# Patient Record
Sex: MALE | Race: WHITE
[De-identification: names, ages, dates, MRNs, and addresses within clinical notes are randomized per-mention and may not be internally consistent; named-entity substitution may affect disease eponyms.]

---

## 2019-06-26 ENCOUNTER — HOSPITAL ENCOUNTER (INPATIENT)
Dept: HOSPITAL 72 - EMR | Age: 74
LOS: 5 days | Discharge: HOME HEALTH | DRG: 684 | End: 2019-07-01
Payer: MEDICARE

## 2019-06-26 VITALS — DIASTOLIC BLOOD PRESSURE: 69 MMHG | SYSTOLIC BLOOD PRESSURE: 156 MMHG

## 2019-06-26 VITALS — WEIGHT: 171 LBS | HEIGHT: 69 IN | BODY MASS INDEX: 25.33 KG/M2

## 2019-06-26 VITALS — SYSTOLIC BLOOD PRESSURE: 125 MMHG | DIASTOLIC BLOOD PRESSURE: 67 MMHG

## 2019-06-26 DIAGNOSIS — R33.9: ICD-10-CM

## 2019-06-26 DIAGNOSIS — N17.9: Primary | ICD-10-CM

## 2019-06-26 DIAGNOSIS — D64.9: ICD-10-CM

## 2019-06-26 DIAGNOSIS — N13.1: ICD-10-CM

## 2019-06-26 DIAGNOSIS — R60.9: ICD-10-CM

## 2019-06-26 DIAGNOSIS — R74.8: ICD-10-CM

## 2019-06-26 DIAGNOSIS — E03.9: ICD-10-CM

## 2019-06-26 DIAGNOSIS — K21.9: ICD-10-CM

## 2019-06-26 DIAGNOSIS — N13.30: ICD-10-CM

## 2019-06-26 DIAGNOSIS — E55.9: ICD-10-CM

## 2019-06-26 DIAGNOSIS — F03.90: ICD-10-CM

## 2019-06-26 DIAGNOSIS — I10: ICD-10-CM

## 2019-06-26 LAB
ADD MANUAL DIFF: NO
ALBUMIN SERPL-MCNC: 3.5 G/DL (ref 3.4–5)
ALBUMIN/GLOB SERPL: 0.8 {RATIO} (ref 1–2.7)
ALP SERPL-CCNC: 76 U/L (ref 46–116)
ALT SERPL-CCNC: 20 U/L (ref 12–78)
ANION GAP SERPL CALC-SCNC: 13 MMOL/L (ref 5–15)
APPEARANCE UR: CLEAR
APTT BLD: 31 SEC (ref 23–33)
APTT PPP: (no result) S
AST SERPL-CCNC: 15 U/L (ref 15–37)
BASOPHILS NFR BLD AUTO: 0.8 % (ref 0–2)
BILIRUB SERPL-MCNC: 0.3 MG/DL (ref 0.2–1)
BUN SERPL-MCNC: 81 MG/DL (ref 7–18)
CALCIUM SERPL-MCNC: 9 MG/DL (ref 8.5–10.1)
CHLORIDE SERPL-SCNC: 101 MMOL/L (ref 98–107)
CK SERPL-CCNC: 124 U/L (ref 26–308)
CO2 SERPL-SCNC: 19 MMOL/L (ref 21–32)
CREAT SERPL-MCNC: 4.7 MG/DL (ref 0.55–1.3)
EOSINOPHIL NFR BLD AUTO: 0 % (ref 0–3)
ERYTHROCYTE [DISTWIDTH] IN BLOOD BY AUTOMATED COUNT: 11.5 % (ref 11.6–14.8)
GLOBULIN SER-MCNC: 4.4 G/DL
GLUCOSE UR STRIP-MCNC: NEGATIVE MG/DL
HCT VFR BLD CALC: 31.6 % (ref 42–52)
HGB BLD-MCNC: 10.2 G/DL (ref 14.2–18)
INR PPP: 1 (ref 0.9–1.1)
KETONES UR QL STRIP: NEGATIVE
LEUKOCYTE ESTERASE UR QL STRIP: NEGATIVE
LYMPHOCYTES NFR BLD AUTO: 8.3 % (ref 20–45)
MCV RBC AUTO: 81 FL (ref 80–99)
MONOCYTES NFR BLD AUTO: 6.8 % (ref 1–10)
NEUTROPHILS NFR BLD AUTO: 84 % (ref 45–75)
NITRITE UR QL STRIP: NEGATIVE
PH UR STRIP: 5 [PH] (ref 4.5–8)
PLATELET # BLD: 421 K/UL (ref 150–450)
POTASSIUM SERPL-SCNC: 4.9 MMOL/L (ref 3.5–5.1)
PROT UR QL STRIP: NEGATIVE
RBC # BLD AUTO: 3.93 M/UL (ref 4.7–6.1)
SODIUM SERPL-SCNC: 133 MMOL/L (ref 136–145)
SP GR UR STRIP: 1.01 (ref 1–1.03)
UROBILINOGEN UR-MCNC: NORMAL MG/DL (ref 0–1)
WBC # BLD AUTO: 11 K/UL (ref 4.8–10.8)

## 2019-06-26 PROCEDURE — 80048 BASIC METABOLIC PNL TOTAL CA: CPT

## 2019-06-26 PROCEDURE — 82550 ASSAY OF CK (CPK): CPT

## 2019-06-26 PROCEDURE — 76770 US EXAM ABDO BACK WALL COMP: CPT

## 2019-06-26 PROCEDURE — 82570 ASSAY OF URINE CREATININE: CPT

## 2019-06-26 PROCEDURE — 85610 PROTHROMBIN TIME: CPT

## 2019-06-26 PROCEDURE — 82607 VITAMIN B-12: CPT

## 2019-06-26 PROCEDURE — 74176 CT ABD & PELVIS W/O CONTRAST: CPT

## 2019-06-26 PROCEDURE — 83690 ASSAY OF LIPASE: CPT

## 2019-06-26 PROCEDURE — 84300 ASSAY OF URINE SODIUM: CPT

## 2019-06-26 PROCEDURE — 82728 ASSAY OF FERRITIN: CPT

## 2019-06-26 PROCEDURE — 85730 THROMBOPLASTIN TIME PARTIAL: CPT

## 2019-06-26 PROCEDURE — 99285 EMERGENCY DEPT VISIT HI MDM: CPT

## 2019-06-26 PROCEDURE — 93005 ELECTROCARDIOGRAM TRACING: CPT

## 2019-06-26 PROCEDURE — 81003 URINALYSIS AUTO W/O SCOPE: CPT

## 2019-06-26 PROCEDURE — 82270 OCCULT BLOOD FECES: CPT

## 2019-06-26 PROCEDURE — 36415 COLL VENOUS BLD VENIPUNCTURE: CPT

## 2019-06-26 PROCEDURE — 71045 X-RAY EXAM CHEST 1 VIEW: CPT

## 2019-06-26 PROCEDURE — 84484 ASSAY OF TROPONIN QUANT: CPT

## 2019-06-26 PROCEDURE — 80053 COMPREHEN METABOLIC PANEL: CPT

## 2019-06-26 PROCEDURE — 83550 IRON BINDING TEST: CPT

## 2019-06-26 PROCEDURE — 83540 ASSAY OF IRON: CPT

## 2019-06-26 PROCEDURE — 85025 COMPLETE CBC W/AUTO DIFF WBC: CPT

## 2019-06-26 NOTE — NUR
HAND-OFF: 

Report given to Jermaine LAWTON. Patient in stable condition.

-------------------------------------------------------------------------------

Addendum: 06/26/19 at 1951 by EUGENIO CODY RN

-------------------------------------------------------------------------------

NURSE NOTES:

Noted light pink color of urine. No complain of pain or discomfort. Notified  and 
no new order at this time. Will continue to monitor.

## 2019-06-26 NOTE — DIAGNOSTIC IMAGING REPORT
Indication:Elevated Bun and Creatinine.

 

Technique: Grayscale and duplex Doppler imaging of the kidneys performed.

 

Comparison: None

 

Findings:

 

Moderate hydronephrosis demonstrated bilaterally. Right kidney length is 11.3 cm and

left kidney length 11 cm. Quan catheter noted in the bladder which is nondistended.

IVC is patent.

 

IMPRESSION:

 

Moderate bilateral hydronephrosis.

 

Quan catheter in good position

## 2019-06-26 NOTE — EMERGENCY ROOM REPORT
History of Present Illness


General


Chief Complaint:  Abnormal Labs


Source:  Patient, PMD





Present Illness


HPI


Patient presents with difficulty urinating.  He was sent by his private 

physician because he had a jump in his serum creatinine and also has edema.  He 

feels fullness in his lower abdomen.  Is uncertain how long this is progressed.

  He is able to produce small quantities of urine. 





The edema has been developing over several months.  It gets better laying down, 

but he has trouble sleeping.  He did not fill a prescription for a water pill.





Apparently labs were performed yesterday and his creatinine bumped up to 4.  

His BNP was normal.


Allergies:  


Coded Allergies:  


     No Known Allergies (Unverified , 6/26/19)





Patient History


Past Medical History:  see triage record


Social History:  Denies: smoking, alcohol use, drug use


Social History Narrative


lives with sister.  Worked in optical lab making glasses


Reviewed Nursing Documentation:  PMH: Agreed; PSxH: Agreed





Review of Systems


All Other Systems:  negative except mentioned in HPI





Physical Exam





Vital Signs








  Date Time  Temp Pulse Resp B/P (MAP) Pulse Ox O2 Delivery O2 Flow Rate FiO2


 


6/26/19 14:51 97.9 104 18 139/75 (96) 98 Room Air  








Sp02 EP Interpretation:  reviewed, normal


General Appearance:  well appearing, no apparent distress, GCS 15


Head:  normocephalic


Eyes:  bilateral eye normal inspection, bilateral eye PERRL


ENT:  moist mucus membranes


Neck:  supple


Respiratory:  lungs clear, normal breath sounds


Cardiovascular #1:  regular rate, rhythm, edema - 2-3+ pitting bilateral lower 

extremities


Cardiovascular #2:  2+ radial (R)


Gastrointestinal:  non tender, soft, mass - Large bladder


Genitourinary:  no CVA tenderness


Musculoskeletal:  back normal, gait/station normal, normal range of motion


Neurologic:  alert, oriented x3, grossly normal


Psychiatric:  mood/affect normal


Skin:  normal inspection, warm/dry





Medical Decision Making


Diagnostic Impression:  


 Primary Impression:  


 Acute renal failure


 Qualified Codes:  N17.9 - Acute kidney failure, unspecified


 Additional Impressions:  


 Urinary retention


 Elevated lipase


ER Course


The patient presents with elevated creatinine edema and urinary retention.  

Differential includes benign prostatic hypertrophy, prostate cancer, medication 

interaction, right heart failure, urinary tract infection amongst others.  The 

patient will be evaluated with labs, EKG chest x-ray abdomen film and renal 

ultrasound.  Quan catheter will be passed.





Initial urine output = 1300 ml.





EKG without injury.  Chest x-ray unremarkable.  Labs with acute renal failure.  

Lipase elevated.  Fractional excretion of sodium less than 1%.





Ultrasound with bilateral hydronephrosis.  This was performed after Quan was 

placed and the bladder was empty at that time.





Patient pain improved.  Admitted to the hospital for continued observation and 

evaluation of renal failure.





Laboratory Tests








Test


  6/26/19


15:00


 


White Blood Count


  11.0 K/UL


(4.8-10.8)  H


 


Red Blood Count


  3.93 M/UL


(4.70-6.10)  L


 


Hemoglobin


  10.2 G/DL


(14.2-18.0)  L


 


Hematocrit


  31.6 %


(42.0-52.0)  L


 


Mean Corpuscular Volume 81 FL (80-99)  


 


Mean Corpuscular Hemoglobin


  26.0 PG


(27.0-31.0)  L


 


Mean Corpuscular Hemoglobin


Concent 32.3 G/DL


(32.0-36.0)


 


Red Cell Distribution Width


  11.5 %


(11.6-14.8)  L


 


Platelet Count


  421 K/UL


(150-450)


 


Mean Platelet Volume


  6.0 FL


(6.5-10.1)  L


 


Neutrophils (%) (Auto)


  84.0 %


(45.0-75.0)  H


 


Lymphocytes (%) (Auto)


  8.3 %


(20.0-45.0)  L


 


Monocytes (%) (Auto)


  6.8 %


(1.0-10.0)


 


Eosinophils (%) (Auto)


  0.0 %


(0.0-3.0)


 


Basophils (%) (Auto)


  0.8 %


(0.0-2.0)


 


Prothrombin Time


  10.7 SEC


(9.30-11.50)


 


Prothrombin Time INR 1.0 (0.9-1.1)  


 


PTT


  31 SEC (23-33)


 


 


Urine Color Pale yellow  


 


Urine Appearance Clear  


 


Urine pH 5 (4.5-8.0)  


 


Urine Specific Gravity


  1.010


(1.005-1.035)


 


Urine Protein


  Negative


(NEGATIVE)


 


Urine Glucose (UA)


  Negative


(NEGATIVE)


 


Urine Ketones


  Negative


(NEGATIVE)


 


Urine Blood


  Negative


(NEGATIVE)


 


Urine Nitrite


  Negative


(NEGATIVE)


 


Urine Bilirubin


  Negative


(NEGATIVE)


 


Urine Urobilinogen


  Normal MG/DL


(0.0-1.0)


 


Urine Leukocyte Esterase


  Negative


(NEGATIVE)


 


Urine Random Sodium


  < 20 mmol/L


()  L


 


Urine Creatinine


  92.1 MG/DL


(30.0-125.0)


 


Sodium Level


  133 MMOL/L


(136-145)  L


 


Potassium Level


  4.9 MMOL/L


(3.5-5.1)


 


Chloride Level


  101 MMOL/L


()


 


Carbon Dioxide Level


  19 MMOL/L


(21-32)  L


 


Anion Gap


  13 mmol/L


(5-15)


 


Blood Urea Nitrogen


  81 mg/dL


(7-18)  H


 


Creatinine


  4.7 MG/DL


(0.55-1.30)  H


 


Estimate Glomerular


Filtration Rate  mL/min (>60)  


 


 


Glucose Level


  125 MG/DL


()  H


 


Calcium Level


  9.0 MG/DL


(8.5-10.1)


 


Total Bilirubin


  0.3 MG/DL


(0.2-1.0)


 


Aspartate Amino Transferase


(AST) 15 U/L (15-37)


 


 


Alanine Aminotransferase (ALT)


  20 U/L (12-78)


 


 


Alkaline Phosphatase


  76 U/L


()


 


Total Creatine Kinase


  124 U/L


()


 


Troponin I


  0.000 ng/mL


(0.000-0.056)


 


Total Protein


  7.9 G/DL


(6.4-8.2)


 


Albumin


  3.5 G/DL


(3.4-5.0)


 


Globulin 4.4 g/dL  


 


Albumin/Globulin Ratio


  0.8 (1.0-2.7)


L


 


Lipase


  1231 U/L


()  H








EKG Diagnostic Results


Rate:  tachycardiac


Rhythm:  NSR


ST Segments:  no acute changes





Rhythm Strip Diag. Results


EP Interpretation:  yes


Rhythm:  no PVC's, no ectopy, other - ST





Chest X-Ray Diagnostic Results


Chest X-Ray Diagnostic Results :  


   Chest X-Ray Ordered:  Yes


   # of Views/Limited/Complete:  1 View


   Indication:  Other


   EP Interpretation:  Yes


   Interpretation:  no consolidation, no effusion, no pneumothorax


   Impression:  No acute disease


   Electronically Signed by:  Electronically signed by Win Juarez MD





CT/MRI/US Diagnostic Results


CT/MRI/US Diagnostic Results :  


   Imaging Test Ordered:  Renal ultrasound


   Impression


Bilateral hydronephrosis


Status:  improved


Disposition:  ADMITTED AS INPATIENT


Condition:  Serious











Win Juarez MD Jun 26, 2019 15:00

## 2019-06-26 NOTE — NUR
ED Nurse Note:



Patient walked in to ER from home with sister due to BLE edema and urinary 
retention. pt aao x4 and ambulatory but impaired. skin clean and intact but 
pale. calm and cooperative. BLE pitting edema +4 noted. pt reported that he got 
a "water pill" prescription but he did not get a chance to go to pharmacy so he 
did not take it for a couple of month. pt is in gown and on cardiac monitor.

## 2019-06-26 NOTE — NUR
NURSE NOTES:

Patient came to unit by rubén in stable condition. Alert and oriented x4. No complain of 
pain or distress at this time. BLE pitting edema +4. IV dressing intact and dry. Belonging 
given to family member. Bed lowest position. Call light within reach. Will continue to 
monitor.

## 2019-06-27 VITALS — DIASTOLIC BLOOD PRESSURE: 57 MMHG | SYSTOLIC BLOOD PRESSURE: 102 MMHG

## 2019-06-27 VITALS — SYSTOLIC BLOOD PRESSURE: 133 MMHG | DIASTOLIC BLOOD PRESSURE: 75 MMHG

## 2019-06-27 VITALS — SYSTOLIC BLOOD PRESSURE: 115 MMHG | DIASTOLIC BLOOD PRESSURE: 67 MMHG

## 2019-06-27 VITALS — DIASTOLIC BLOOD PRESSURE: 73 MMHG | SYSTOLIC BLOOD PRESSURE: 128 MMHG

## 2019-06-27 VITALS — DIASTOLIC BLOOD PRESSURE: 62 MMHG | SYSTOLIC BLOOD PRESSURE: 101 MMHG

## 2019-06-27 LAB
ADD MANUAL DIFF: NO
ANION GAP SERPL CALC-SCNC: 12 MMOL/L (ref 5–15)
BASOPHILS NFR BLD AUTO: 0.8 % (ref 0–2)
BUN SERPL-MCNC: 60 MG/DL (ref 7–18)
CALCIUM SERPL-MCNC: 8.6 MG/DL (ref 8.5–10.1)
CHLORIDE SERPL-SCNC: 112 MMOL/L (ref 98–107)
CO2 SERPL-SCNC: 22 MMOL/L (ref 21–32)
CREAT SERPL-MCNC: 3.1 MG/DL (ref 0.55–1.3)
EOSINOPHIL NFR BLD AUTO: 0.6 % (ref 0–3)
ERYTHROCYTE [DISTWIDTH] IN BLOOD BY AUTOMATED COUNT: 12.3 % (ref 11.6–14.8)
HCT VFR BLD CALC: 29.7 % (ref 42–52)
HGB BLD-MCNC: 9.5 G/DL (ref 14.2–18)
LYMPHOCYTES NFR BLD AUTO: 15.9 % (ref 20–45)
MCV RBC AUTO: 79 FL (ref 80–99)
MONOCYTES NFR BLD AUTO: 8.4 % (ref 1–10)
NEUTROPHILS NFR BLD AUTO: 74.3 % (ref 45–75)
PLATELET # BLD: 376 K/UL (ref 150–450)
POTASSIUM SERPL-SCNC: 4.1 MMOL/L (ref 3.5–5.1)
RBC # BLD AUTO: 3.76 M/UL (ref 4.7–6.1)
SODIUM SERPL-SCNC: 146 MMOL/L (ref 136–145)
WBC # BLD AUTO: 7.1 K/UL (ref 4.8–10.8)

## 2019-06-27 NOTE — CONSULTATION
DATE OF CONSULTATION:  06/27/2019

CHIEF COMPLAINT:  I was asked to see this patient by Dr. Pasha Diaz

for evaluation of elevated lipase.



HISTORY OF PRESENT ILLNESS:  The patient is a pleasant 73-year-old man who

was seen in the office with worsening lower extremity edema and  ______

was sent to the hospital with acute renal failure.  He has been admitted

and treated with a Quan catheter which drained 1500 mL.  The patient also

was noted to have some lower extremity edema which is being treated.

Admission laboratory parameters also showed elevated lipase which prompted

this consultation.  The patient denies any previous history of

pancreatitis or any alcohol use.  He has never had any pancreatic stones

or gallstones.  He denies any abdominal pain, nausea, vomiting.  He also

has not had any colonoscopy in the past.



PAST MEDICAL HISTORY:  History of hypertension, lower extremity edema,

renal failure, cognitive dysfunction, hypothyroidism, gastroesophageal

reflux disease, hypertension.



MEDICATIONS:  Noted.



ALLERGIES:  None.



FAMILY HISTORY:  Noncontributory and negative for pancreatic disorder.



SOCIAL HISTORY:  The patient does not smoke or drink alcohol.  He is

disabled.



REVIEW OF SYSTEMS:  Otherwise negative.



PHYSICAL EXAMINATION:

GENERAL:  A pleasant white man, seen in his room.

HEENT:  Normocephalic and atraumatic.  Sclerae anicteric.  Oropharynx

clear.

NECK:  Supple.

CHEST:  Clear to auscultation.

CARDIOVASCULAR:  Regular rate.

ABDOMEN:  Soft and nontender with good bowel sounds.  There was no masses.

 

EXTREMITIES:  Revealed 2 to 3+ edema bilaterally.



LABORATORY DATA:  Noted.



ASSESSMENT:  This patient presents with asymptomatic incidental finding of

elevated lipase of unclear etiology.  The patient has no abdominal

symptoms but he can undergo a screening CT scan to evaluate any

significant pathology.  If problem persist, then ultrasound or MRCP can be

done to evaluate bile ducts.  In the meantime, his stool should also be

checked for occult blood since he is mildly anemic.  I have advised him to

undergo a colonoscopy and endoscopy as an outpatient.  He also complains

of some loss of sensation of taste and therefore I will check his B12

levels.  The patient will be followed closely and further recommendations

will be given.



RECOMMENDATIONS:  Per above discussion and per orders in the chart.



Thank you for asking me to participate in care of this patient.









  ______________________________________________

  Douglas Camargo M.D.





DR:  Sayra

D:  06/27/2019 12:05

T:  06/27/2019 16:02

JOB#:  [id number]

CC:

## 2019-06-27 NOTE — DIAGNOSTIC IMAGING REPORT
Indication: Abdominal pain. Bilateral hydronephrosis

 

Technique: Continuous helical transaxial imaging of the abdomen and pelvis was

obtained from the lung bases to the pubic symphysis. No intravenous contrast was

administered. Coronal 2-D reformats were also obtained. Automatic Exposure Control

was utilized.

 

 

Total Dose length Product (DLP):  524.3 mGycm

 

CT Dose Index Volume (CTDIvol):   11.02 mGy

 

Comparison: none

 

Findings: Note is made of the recent kidney ultrasound which showed bilateral

hydronephrosis. This is confirmed on the current examination which shows moderate to

severe bilateral hydronephrosis. There is a in addition thickening of the

uroepithelium particularly within the left kidney involving the left renal pelvis and

ureter. There is a moderate degree of perinephric stranding on the left side. The

proximal part of the ureter is seen but it is difficult to follow the mid and distal

aspect of the ureter. At the UVJ there is no stone. However as above the pelvic brim

there is a small calcification in the expected location and course of the left

ureter. The calcification is about 3 to 4 mm in size and could represent a small

obstructing stone although it is more likely to represent arterial calcium within a

branch of the left iliac artery. Furthermore this does not account for hydronephrosis

seen on the right. There is no stone in the distribution of the right ureter. There

is marked thickening of the wall the urinary bladder which could account for the

hydronephrosis on both sides. There is a bladder catheter noted which appears to be

in good position.

 

Bowel gas pattern is nonobstructive. The gallbladder is unremarkable. Aortoiliac

calcifications are present consistent with atherosclerotic disease. Suggestion of a

subtle small nodes in the retroperitoneum. Bowel gas pattern appears nonobstructive.

There is diffuse generalized subcutaneous edema within the abdominal wall. There is

narrowing of intervertebral discs and accompanying endplate osteophyte formation. 

Hypertrophied facet joints also demonstrated.. The lung bases are clear

 

IMPRESSION:

 

Moderate to severe bilateral hydronephrosis with thickened uroepithelium and

periureteral and perinephric stranding, findings suggestive of chronic inflammation.

The findings are probably associated with thickening of the wall the urinary bladder

which is moderate degree. Please correlate for UTI chronic infection.

 

3 mm calcification within the left lower quadrant abdomen. This is in the approximate

location of the mid left ureter although the calcium is favored to represent arterial

calcification rather than a ureteral stone. Repeat CT study with IV contrast material

may be of benefit to further elucidate.

 

Quan catheter in good position

 

Degenerative changes of the spine

 

Atherosclerotic vascular disease

 

 

 

 

The CT scanner at [facility] is accredited by the [employer] College of

Radiology and the scans are performed using dose optimization techniques as

appropriate to a performed exam including Automatic Exposure control.

## 2019-06-27 NOTE — NUR
NURSE NOTES:

Patient lying in bed awake. No complain of pain or distress at this time. Skin intact and 
dry. IV dressing intact and dry. Quan catheter patent and draining well. Light pink color 
urine noted and MD aware. Bed lowest position. Call light within reach. Will continue to 
monitor.

## 2019-06-27 NOTE — NUR
NURSE NOTES:

Received report from JERED Swan and rounds done. Received pt in bed, AOX4, denies any pain, 
no distress noted. IV R UA patent and intact. IV fluid infusing as ordered. Quan to gravity 
with yellow color urine output. Bed in lowest in position and locked, side rails up x 2, 
call light within reach. Will continue to monitor.

## 2019-06-27 NOTE — NUR
CASE MANAGEMENT: INITIAL REVIEW 



72 YO M PRESENTED FROM MD OFFICE 



CC: ABNORMAL KIDNEY FUNCTION AND URINARY RETENTION. 



PMHx: HTN. HYPOTHYROIDISM. GERD. 



SI:ARF. 

T 97.9  R 18 B/P 139/75 SATS 98% ON RA 

WBC 11  CO 19 BUN 81 CR 4.7  LIPASE 1231 



IS: US RENAL (IMPRESSION: Moderate bilateral hydronephrosis) 



***PATIENT ADMITTED TO MED/SURG 6/26/2019 @ 4584****



DCP: PATIENT TO BE DISCHARGED TO HOME ONCE MEDICALLY CLEARED. 



PLAN OF CARE: 

GI CONSULT 

CT ABD 

-------------------------------------------------------------------------------

Addendum: 06/27/19 at 1322 by Xuan Segal CM

-------------------------------------------------------------------------------

INTERQUAL MET

## 2019-06-27 NOTE — HISTORY AND PHYSICAL REPORT
DATE OF ADMISSION:  06/26/2019

REASON FOR ADMISSION:  Acute renal failure, urinary retention, and possible

pancreatitis.



HISTORY:  This is a 73-year-old male, seen in my office yesterday with

worsening lower extremity edema and noted worsening difficulty with

urinary output.  The patient is noted to have acute renal failure and was

asked to come to the emergency room and now admitted.  The patient had a

Quan catheter placed with significant amount of urinary retention at 1500

mL.  The patient is also noted to have lower extremity edema.  Denies any

significant abdominal pain although his lipase is elevated.



PAST MEDICAL HISTORY:  Notable for hypertension, prior history of lower

extremity edema, which has resolved, cognitive dysfunction,

hypothyroidism, reflux disease, and hypertension.



MEDICATIONS:  Reviewed.



ALLERGIES:  Reviewed.



SOCIAL HISTORY:  The patient is a nonsmoker and nondrinker.  Disabled.



REVIEW OF SYSTEMS:  All 10 points reviewed and otherwise negative.



PHYSICAL EXAMINATION:

GENERAL:  A well-developed male, comfortable at present.

VITAL SIGNS:  Blood pressure 138/84, heart rate 93, respirations 18,

saturations 98%, and temperature 97.9.

HEENT:  Negative.  Extraocular movements are grossly intact.

NECK:  Supple.

LUNGS:  Fairly clear and symmetric.  No rhonchi or wheezes.

CARDIAC:  S1 and S2.  Regular rate and rhythm without murmurs, rubs, or

gallops.

ABDOMEN:  Soft, nontender, and nondistended.

EXTREMITIES:  No cyanosis or clubbing.  There is no erythema.

NEUROLOGIC:  Grossly nonfocal.



LABORATORY DATA:  BUN 81, creatinine 4.7, and blood sugar 125.  White cell

count 9, hemoglobin 10, and platelets of 421,000.  INR within normal

limits.  Renal ultrasound with significant amount of necrosis.



IMPRESSION:

1. Obstructive uropathy with acute renal failure.

2. Bilateral hydronephrosis.

3. Distended bladder.

4. Hypertension.

5. Vitamin D deficiency.



RECOMMENDATIONS:

1. Supportive care.

2. Quan catheter placement.

3. IV hydration.

4. Renal and  evaluation.

5. Chronic catheter placement.

6. We will follow clinically and recommend GI for  consideration for

pancreatitis.

7. We will obtain a GI evaluation to assess further and the patient is

presently on acute hospital stay.









  ______________________________________________

  Pasha Diaz M.D.





DR:  GRACE

D:  06/26/2019 19:47

T:  06/27/2019 03:11

JOB#:  [id number]

CC:



BRANDON

## 2019-06-27 NOTE — GENERAL PROGRESS NOTE
Assessment/Plan


Assessment/Plan:


Assessment


- asymptomatic lipase elevation, doubt pancreatitis


- anemia


- Edema


- renal failure


- loss of taste sensation





Recommendation


- po as tolerated


- check CT of pancreas


- follow lipase


- check stool OB


- check B12 level


- outpatient EGD/Colon





Thank you


Douglas Camargo MD





Subjective


Allergies:  


Coded Allergies:  


     No Known Allergies (Unverified , 6/26/19)





Objective





Last 24 Hour Vital Signs








  Date Time  Temp Pulse Resp B/P (MAP) Pulse Ox O2 Delivery O2 Flow Rate FiO2


 


6/27/19 11:51 98.0 95 20 115/67 (83) 97   


 


6/27/19 09:00      Room Air  


 


6/27/19 08:44  102  133/75    


 


6/27/19 08:00 97.5 102 20 133/75 (94) 95   


 


6/27/19 05:58 97.7 88 18 128/73 (91) 98   


 


6/26/19 21:00      Room Air  


 


6/26/19 20:00 98.3 98 17 125/67 (86) 97   


 


6/26/19 16:42      Room Air  


 


6/26/19 16:30 97.9 93 18 164/72 98 Room Air  


 


6/26/19 15:00 97.9 96 18 156/69 98 Room Air  


 


6/26/19 15:00  95 18   Room Air  


 


6/26/19 14:51 97.9 104 18 139/75 (96) 98 Room Air  

















Intake and Output  


 


 6/26/19 6/27/19





 18:59 06:59


 


Intake Total 0 ml 1300 ml


 


Output Total 1300 ml 5800 ml


 


Balance -1300 ml -4500 ml


 


  


 


Intake Oral 0 ml 200 ml


 


IV Total  1100 ml


 


Output Urine Total 1300 ml 5800 ml








Laboratory Tests


6/26/19 15:00: 


White Blood Count 11.0H, Red Blood Count 3.93L, Hemoglobin 10.2L, Hematocrit 

31.6L, Mean Corpuscular Volume 81, Mean Corpuscular Hemoglobin 26.0L, Mean 

Corpuscular Hemoglobin Concent 32.3, Red Cell Distribution Width 11.5L, 

Platelet Count 421, Mean Platelet Volume 6.0L, Neutrophils (%) (Auto) 84.0H, 

Lymphocytes (%) (Auto) 8.3L, Monocytes (%) (Auto) 6.8, Eosinophils (%) (Auto) 

0.0, Basophils (%) (Auto) 0.8, Prothrombin Time 10.7, Prothromb Time 

International Ratio 1.0, Activated Partial Thromboplast Time 31, Urine Color 

Pale yellow, Urine Appearance Clear, Urine pH 5, Urine Specific Gravity 1.010, 

Urine Protein Negative, Urine Glucose (UA) Negative, Urine Ketones Negative, 

Urine Blood Negative, Urine Nitrite Negative, Urine Bilirubin Negative, Urine 

Urobilinogen Normal, Urine Leukocyte Esterase Negative, Urine Random Sodium < 

20L, Urine Creatinine 92.1, Sodium Level 133L, Potassium Level 4.9, Chloride 

Level 101, Carbon Dioxide Level 19L, Anion Gap 13, Blood Urea Nitrogen 81H, 

Creatinine 4.7H, Estimat Glomerular Filtration Rate , Glucose Level 125H, 

Calcium Level 9.0, Total Bilirubin 0.3, Aspartate Amino Transf (AST/SGOT) 15, 

Alanine Aminotransferase (ALT/SGPT) 20, Alkaline Phosphatase 76, Total Creatine 

Kinase 124, Troponin I 0.000, Total Protein 7.9, Albumin 3.5, Globulin 4.4, 

Albumin/Globulin Ratio 0.8L, Lipase 1231H


6/27/19 06:08: 


White Blood Count 7.1, Red Blood Count 3.76L, Hemoglobin 9.5L, Hematocrit 29.7L

, Mean Corpuscular Volume 79L, Mean Corpuscular Hemoglobin 25.3L, Mean 

Corpuscular Hemoglobin Concent 32.0, Red Cell Distribution Width 12.3, Platelet 

Count 376, Mean Platelet Volume 6.0L, Neutrophils (%) (Auto) 74.3, Lymphocytes (

%) (Auto) 15.9L, Monocytes (%) (Auto) 8.4, Eosinophils (%) (Auto) 0.6, 

Basophils (%) (Auto) 0.8, Sodium Level 146#H, Potassium Level 4.1, Chloride 

Level 112H, Carbon Dioxide Level 22, Anion Gap 12, Blood Urea Nitrogen 60H, 

Creatinine 3.1H, Estimat Glomerular Filtration Rate , Glucose Level 96, Calcium 

Level 8.6


Height (Feet):  5


Height (Inches):  9.00


Weight (Pounds):  171











Douglas Camargo MD Jun 27, 2019 11:59

## 2019-06-27 NOTE — NUR
NURSE NOTES:

Spoke to  regarding morning lab result and new order received. Order read back and 
carried out.

## 2019-06-27 NOTE — GENERAL PROGRESS NOTE
Assessment/Plan


Assessment/Plan:





IMPRESSION:


1. Obstructive uropathy with acute renal failure.


2. Bilateral hydronephrosis.


3. Distended bladder.


4. Hypertension.


5. Vitamin D deficiency.





PLAN


care noted and reviewed


wade





renal


hydrate


monitor 


d/w patient and update given


impression, plan, and exam edited and reviewed in detail


care discussed with RN





Subjective


Allergies:  


Coded Allergies:  


     No Known Allergies (Unverified , 6/26/19)


Subjective


care noted


renal function better


renal/uro called





Objective





Last 24 Hour Vital Signs








  Date Time  Temp Pulse Resp B/P (MAP) Pulse Ox O2 Delivery O2 Flow Rate FiO2


 


6/27/19 05:58 97.7 88 18 128/73 (91) 98   


 


6/26/19 21:00      Room Air  


 


6/26/19 20:00 98.3 98 17 125/67 (86) 97   


 


6/26/19 16:42      Room Air  


 


6/26/19 16:30 97.9 93 18 164/72 98 Room Air  


 


6/26/19 15:00 97.9 96 18 156/69 98 Room Air  


 


6/26/19 15:00  95 18   Room Air  


 


6/26/19 14:51 97.9 104 18 139/75 (96) 98 Room Air  

















Intake and Output  


 


 6/26/19 6/27/19





 19:00 07:00


 


Intake Total 0 ml 1300 ml


 


Output Total 1300 ml 5800 ml


 


Balance -1300 ml -4500 ml


 


  


 


Intake Oral 0 ml 200 ml


 


IV Total  1100 ml


 


Output Urine Total 1300 ml 5800 ml








Laboratory Tests


6/26/19 15:00: 


White Blood Count 11.0H, Red Blood Count 3.93L, Hemoglobin 10.2L, Hematocrit 

31.6L, Mean Corpuscular Volume 81, Mean Corpuscular Hemoglobin 26.0L, Mean 

Corpuscular Hemoglobin Concent 32.3, Red Cell Distribution Width 11.5L, 

Platelet Count 421, Mean Platelet Volume 6.0L, Neutrophils (%) (Auto) 84.0H, 

Lymphocytes (%) (Auto) 8.3L, Monocytes (%) (Auto) 6.8, Eosinophils (%) (Auto) 

0.0, Basophils (%) (Auto) 0.8, Prothrombin Time 10.7, Prothromb Time 

International Ratio 1.0, Activated Partial Thromboplast Time 31, Urine Color 

Pale yellow, Urine Appearance Clear, Urine pH 5, Urine Specific Gravity 1.010, 

Urine Protein Negative, Urine Glucose (UA) Negative, Urine Ketones Negative, 

Urine Blood Negative, Urine Nitrite Negative, Urine Bilirubin Negative, Urine 

Urobilinogen Normal, Urine Leukocyte Esterase Negative, Urine Random Sodium < 

20L, Urine Creatinine 92.1, Sodium Level 133L, Potassium Level 4.9, Chloride 

Level 101, Carbon Dioxide Level 19L, Anion Gap 13, Blood Urea Nitrogen 81H, 

Creatinine 4.7H, Estimat Glomerular Filtration Rate , Glucose Level 125H, 

Calcium Level 9.0, Total Bilirubin 0.3, Aspartate Amino Transf (AST/SGOT) 15, 

Alanine Aminotransferase (ALT/SGPT) 20, Alkaline Phosphatase 76, Total Creatine 

Kinase 124, Troponin I 0.000, Total Protein 7.9, Albumin 3.5, Globulin 4.4, 

Albumin/Globulin Ratio 0.8L, Lipase 1231H


6/27/19 06:08: 


White Blood Count 7.1, Red Blood Count 3.76L, Hemoglobin 9.5L, Hematocrit 29.7L

, Mean Corpuscular Volume 79L, Mean Corpuscular Hemoglobin 25.3L, Mean 

Corpuscular Hemoglobin Concent 32.0, Red Cell Distribution Width 12.3, Platelet 

Count 376, Mean Platelet Volume 6.0L, Neutrophils (%) (Auto) 74.3, Lymphocytes (

%) (Auto) 15.9L, Monocytes (%) (Auto) 8.4, Eosinophils (%) (Auto) 0.6, 

Basophils (%) (Auto) 0.8, Sodium Level 146#H, Potassium Level 4.1, Chloride 

Level 112H, Carbon Dioxide Level 22, Anion Gap 12, Blood Urea Nitrogen 60H, 

Creatinine 3.1H, Estimat Glomerular Filtration Rate , Glucose Level 96, Calcium 

Level 8.6


Height (Feet):  5


Height (Inches):  9.00


Weight (Pounds):  171


Objective


GENERAL:  A well-developed male, comfortable at present.


HEENT:  Negative.  Extraocular movements are grossly intact.


NECK:  Supple.


LUNGS:  Fairly clear and symmetric.  No rhonchi or wheezes.


CARDIAC:  S1 and S2.  Regular rate and rhythm without murmurs, rubs, or


gallops.


ABDOMEN:  Soft, nontender, and nondistended.


EXTREMITIES:  No cyanosis or clubbing.  There is no erythema.


NEUROLOGIC:  Grossly nonfocal.


wade in place











Pasha Diaz MD Jun 27, 2019 08:05

## 2019-06-28 VITALS — SYSTOLIC BLOOD PRESSURE: 138 MMHG | DIASTOLIC BLOOD PRESSURE: 78 MMHG

## 2019-06-28 VITALS — DIASTOLIC BLOOD PRESSURE: 73 MMHG | SYSTOLIC BLOOD PRESSURE: 126 MMHG

## 2019-06-28 VITALS — SYSTOLIC BLOOD PRESSURE: 111 MMHG | DIASTOLIC BLOOD PRESSURE: 71 MMHG

## 2019-06-28 VITALS — SYSTOLIC BLOOD PRESSURE: 109 MMHG | DIASTOLIC BLOOD PRESSURE: 73 MMHG

## 2019-06-28 VITALS — DIASTOLIC BLOOD PRESSURE: 68 MMHG | SYSTOLIC BLOOD PRESSURE: 110 MMHG

## 2019-06-28 VITALS — SYSTOLIC BLOOD PRESSURE: 124 MMHG | DIASTOLIC BLOOD PRESSURE: 74 MMHG

## 2019-06-28 LAB
% IRON SATURATION: 32 % (ref 15–50)
ADD MANUAL DIFF: NO
ANION GAP SERPL CALC-SCNC: 12 MMOL/L (ref 5–15)
BASOPHILS NFR BLD AUTO: 1.2 % (ref 0–2)
BUN SERPL-MCNC: 42 MG/DL (ref 7–18)
CALCIUM SERPL-MCNC: 8.5 MG/DL (ref 8.5–10.1)
CHLORIDE SERPL-SCNC: 112 MMOL/L (ref 98–107)
CO2 SERPL-SCNC: 23 MMOL/L (ref 21–32)
CREAT SERPL-MCNC: 2 MG/DL (ref 0.55–1.3)
EOSINOPHIL NFR BLD AUTO: 1.1 % (ref 0–3)
ERYTHROCYTE [DISTWIDTH] IN BLOOD BY AUTOMATED COUNT: 12.4 % (ref 11.6–14.8)
FERRITIN SERPL-MCNC: 115 NG/ML (ref 8–388)
HCT VFR BLD CALC: 30.9 % (ref 42–52)
HGB BLD-MCNC: 9.8 G/DL (ref 14.2–18)
IRON SERPL-MCNC: 62 UG/DL (ref 50–175)
LYMPHOCYTES NFR BLD AUTO: 19.8 % (ref 20–45)
MCV RBC AUTO: 80 FL (ref 80–99)
MONOCYTES NFR BLD AUTO: 7.9 % (ref 1–10)
NEUTROPHILS NFR BLD AUTO: 69.9 % (ref 45–75)
PLATELET # BLD: 372 K/UL (ref 150–450)
POTASSIUM SERPL-SCNC: 3.8 MMOL/L (ref 3.5–5.1)
RBC # BLD AUTO: 3.87 M/UL (ref 4.7–6.1)
SODIUM SERPL-SCNC: 147 MMOL/L (ref 136–145)
TIBC SERPL-MCNC: 196 UG/DL (ref 250–450)
UNSATURATED IRON BINDING: 134 UG/DL (ref 112–346)
WBC # BLD AUTO: 7.6 K/UL (ref 4.8–10.8)

## 2019-06-28 RX ADMIN — TAMSULOSIN HYDROCHLORIDE SCH MG: 0.4 CAPSULE ORAL at 21:18

## 2019-06-28 NOTE — GENERAL PROGRESS NOTE
Assessment/Plan


Assessment/Plan:


Assessment


- asymptomatic lipase elevation, doubt pancreatitis


- anemia, OB (-) x 1


- Edema


- b/l hydro per CT


- renal failure - improved


- loss of taste sensation





Recommendation


- po as tolerated


- follow lipase


- check B12 level - normal


- outpatient EGD/Colon





Subjective


Allergies:  


Coded Allergies:  


     No Known Allergies (Unverified , 6/26/19)


Subjective


above noted


had CT scan --> b/l hydronephrosis, ? urolithiasis


tolerating po





Objective





Last 24 Hour Vital Signs








  Date Time  Temp Pulse Resp B/P (MAP) Pulse Ox O2 Delivery O2 Flow Rate FiO2


 


6/28/19 12:00 97.8 88 20 138/78 (98) 99   


 


6/28/19 09:00      Room Air  


 


6/28/19 08:31  87  110/68    


 


6/28/19 08:00 97.9 87 18 110/68 (82) 97   


 


6/28/19 04:00 97.9 89 16 109/73 (85) 98   


 


6/28/19 00:00 98.1 91 16 126/73 (90) 98   


 


6/27/19 21:00      Room Air  


 


6/27/19 20:00 99.2 97 16 102/57 (72) 95   

















Intake and Output  


 


 6/27/19 6/28/19





 18:59 06:59


 


Intake Total 500 ml 1320 ml


 


Output Total 3500 ml 3500 ml


 


Balance -3000 ml -2180 ml


 


  


 


Intake Oral 500 ml 120 ml


 


IV Total  1200 ml


 


Output Urine Total 3500 ml 3500 ml








Laboratory Tests


6/28/19 05:25: 


White Blood Count 7.6, Red Blood Count 3.87L, Hemoglobin 9.8L, Hematocrit 30.9L

, Mean Corpuscular Volume 80, Mean Corpuscular Hemoglobin 25.3L, Mean 

Corpuscular Hemoglobin Concent 31.7L, Red Cell Distribution Width 12.4, 

Platelet Count 372, Mean Platelet Volume 6.0L, Neutrophils (%) (Auto) 69.9, 

Lymphocytes (%) (Auto) 19.8L, Monocytes (%) (Auto) 7.9, Eosinophils (%) (Auto) 

1.1, Basophils (%) (Auto) 1.2, Sodium Level 147H, Potassium Level 3.8, Chloride 

Level 112H, Carbon Dioxide Level 23, Anion Gap 12, Blood Urea Nitrogen 42H, 

Creatinine 2.0H, Estimat Glomerular Filtration Rate , Glucose Level 101, 

Calcium Level 8.5, Iron Level 62, Total Iron Binding Capacity 196L, Percent 

Iron Saturation 32, Unsaturated Iron Binding 134, Ferritin 115, Vitamin B12 

Level 472


Height (Feet):  5


Height (Inches):  9.00


Weight (Pounds):  171


Objective


WDWN WM


NCAT


supple


CTA


RR


abd soft ND NT


no edema











Douglas Camargo MD Jun 28, 2019 16:19

## 2019-06-28 NOTE — GENERAL PROGRESS NOTE
Assessment/Plan


Problem List:  


(1) Elevated lipase


ICD Codes:  R74.8 - Abnormal levels of other serum enzymes


SNOMED:  771976911


(2) Urinary retention


ICD Codes:  R33.9 - Retention of urine, unspecified


SNOMED:  007937342


(3) Acute renal failure


ICD Codes:  N17.9 - Acute kidney failure, unspecified


SNOMED:  50811196


Status:  stable, progressing


Assessment/Plan:


ivf


monitor labs and renal fxn


gi noted,





Subjective


ROS Limited/Unobtainable:  No


Constitutional:  Reports: malaise, weakness


HEENT:  Reports: no symptoms


Cardiovascular:  Reports: no symptoms


Respiratory:  Reports: no symptoms


Gastrointestinal/Abdominal:  Reports: no symptoms


Genitourinary:  Reports: no symptoms


Neurologic/Psychiatric:  Reports: no symptoms


Endocrine:  Reports: no symptoms


Hematologic/Lymphatic:  Reports: no symptoms


Allergies:  


Coded Allergies:  


     No Known Allergies (Unverified , 6/26/19)


All Systems:  reviewed and negative except above


Subjective


no complaints. states he feels better.





Objective





Last 24 Hour Vital Signs








  Date Time  Temp Pulse Resp B/P (MAP) Pulse Ox O2 Delivery O2 Flow Rate FiO2


 


6/28/19 16:00 97.9 89 18 111/71 (84) 97   


 


6/28/19 12:00 97.8 88 20 138/78 (98) 99   


 


6/28/19 09:00      Room Air  


 


6/28/19 08:31  87  110/68    


 


6/28/19 08:00 97.9 87 18 110/68 (82) 97   


 


6/28/19 04:00 97.9 89 16 109/73 (85) 98   


 


6/28/19 00:00 98.1 91 16 126/73 (90) 98   


 


6/27/19 21:00      Room Air  


 


6/27/19 20:00 99.2 97 16 102/57 (72) 95   

















Intake and Output  


 


 6/27/19 6/28/19





 18:59 06:59


 


Intake Total 500 ml 1320 ml


 


Output Total 3500 ml 3500 ml


 


Balance -3000 ml -2180 ml


 


  


 


Intake Oral 500 ml 120 ml


 


IV Total  1200 ml


 


Output Urine Total 3500 ml 3500 ml








Laboratory Tests


6/28/19 05:25: 


White Blood Count 7.6, Red Blood Count 3.87L, Hemoglobin 9.8L, Hematocrit 30.9L

, Mean Corpuscular Volume 80, Mean Corpuscular Hemoglobin 25.3L, Mean 

Corpuscular Hemoglobin Concent 31.7L, Red Cell Distribution Width 12.4, 

Platelet Count 372, Mean Platelet Volume 6.0L, Neutrophils (%) (Auto) 69.9, 

Lymphocytes (%) (Auto) 19.8L, Monocytes (%) (Auto) 7.9, Eosinophils (%) (Auto) 

1.1, Basophils (%) (Auto) 1.2, Sodium Level 147H, Potassium Level 3.8, Chloride 

Level 112H, Carbon Dioxide Level 23, Anion Gap 12, Blood Urea Nitrogen 42H, 

Creatinine 2.0H, Estimat Glomerular Filtration Rate , Glucose Level 101, 

Calcium Level 8.5, Iron Level 62, Total Iron Binding Capacity 196L, Percent 

Iron Saturation 32, Unsaturated Iron Binding 134, Ferritin 115, Vitamin B12 

Level 472


Height (Feet):  5


Height (Inches):  9.00


Weight (Pounds):  171


General Appearance:  WD/WN, alert


Neck:  supple


Cardiovascular:  normal rate


Respiratory/Chest:  chest wall non-tender, lungs clear, normal breath sounds


Abdomen:  normal bowel sounds, non tender, soft, no mass


Neurologic:  CNs II-XII grossly normal, alert, oriented x 3


Lymphatic:  normal anterior cervical (L), normal anterior cervical (R), normal 

posterior cervical (L), normal posterior cervical (R), normal submandibular (L)

, normal submandibular (R), normal supraclavicular (L), normal supraclavicular (

R), normal axillary (L), normal axillary (R), normal inguinal (L), normal 

inguinal (R), normal other











Elia Roth MD Jun 28, 2019 17:10

## 2019-06-28 NOTE — NUR
NURSE NOTES:

Received report from Jermaine LAWTON. Patient is awake alert and oriented x4, no acute distress 
noted. Sitting up in bed eating breakfast. SCD's not on due to +4 edema in bilateral legs. 
IVF running per order, IV intact and asymptomatic. Patient reporting no pain at this time. 
Quan to gravity drainage with clear, yellow urine noted. Fall precautions maintained, side 
rails upx3, bed low and locked, call light in reach. Will continue to monitor.

## 2019-06-28 NOTE — NUR
NURSE NOTES:

Pt is awake and alert. Breathing is even and non labored. No acute distress noted. No pain 
noted. Quan catheter inserted state patent with yellowish urine without hematuria or 
sediments. LE pitting edema noted and keep elevated with a pillow. Pt refuses to put SCDs 
d/t noises. Explain benefits to put SCDs and reapply them on. Leave call light within reach. 
Bed is locked and lowest position. Will continue to monitor.

## 2019-06-28 NOTE — CARDIOLOGY REPORT
--------------- APPROVED REPORT --------------





EKG Measurement

Heart [id number]

HI [id number]

[id number]

[id number]

[id number]





Sinus tachycardia

Nonspecific ST abnormality

Abnormal ECG

## 2019-06-29 VITALS — DIASTOLIC BLOOD PRESSURE: 69 MMHG | SYSTOLIC BLOOD PRESSURE: 110 MMHG

## 2019-06-29 VITALS — DIASTOLIC BLOOD PRESSURE: 67 MMHG | SYSTOLIC BLOOD PRESSURE: 117 MMHG

## 2019-06-29 VITALS — DIASTOLIC BLOOD PRESSURE: 63 MMHG | SYSTOLIC BLOOD PRESSURE: 101 MMHG

## 2019-06-29 VITALS — SYSTOLIC BLOOD PRESSURE: 118 MMHG | DIASTOLIC BLOOD PRESSURE: 64 MMHG

## 2019-06-29 VITALS — SYSTOLIC BLOOD PRESSURE: 119 MMHG | DIASTOLIC BLOOD PRESSURE: 64 MMHG

## 2019-06-29 VITALS — SYSTOLIC BLOOD PRESSURE: 99 MMHG | DIASTOLIC BLOOD PRESSURE: 64 MMHG

## 2019-06-29 LAB
ALBUMIN SERPL-MCNC: 2.7 G/DL (ref 3.4–5)
ALBUMIN/GLOB SERPL: 0.9 {RATIO} (ref 1–2.7)
ALP SERPL-CCNC: 59 U/L (ref 46–116)
ALT SERPL-CCNC: 19 U/L (ref 12–78)
ANION GAP SERPL CALC-SCNC: 10 MMOL/L (ref 5–15)
AST SERPL-CCNC: 16 U/L (ref 15–37)
BILIRUB SERPL-MCNC: 0.1 MG/DL (ref 0.2–1)
BUN SERPL-MCNC: 32 MG/DL (ref 7–18)
CALCIUM SERPL-MCNC: 7.6 MG/DL (ref 8.5–10.1)
CHLORIDE SERPL-SCNC: 105 MMOL/L (ref 98–107)
CO2 SERPL-SCNC: 24 MMOL/L (ref 21–32)
CREAT SERPL-MCNC: 1.9 MG/DL (ref 0.55–1.3)
GLOBULIN SER-MCNC: 3.1 G/DL
POTASSIUM SERPL-SCNC: 4.2 MMOL/L (ref 3.5–5.1)
SODIUM SERPL-SCNC: 139 MMOL/L (ref 136–145)

## 2019-06-29 RX ADMIN — DOCUSATE SODIUM SCH MG: 250 CAPSULE, LIQUID FILLED ORAL at 16:05

## 2019-06-29 RX ADMIN — TAMSULOSIN HYDROCHLORIDE SCH MG: 0.4 CAPSULE ORAL at 21:02

## 2019-06-29 NOTE — NUR
NURSE NOTES:

Received report from JERED Swan. Patient alert, sitting in bed, watching TV. No distress 
noted. Bed in low position, locked, side rails up x2, call light within reach.Will continue 
to monitor.

## 2019-06-29 NOTE — GENERAL PROGRESS NOTE
Assessment/Plan


Problem List:  


(1) Elevated lipase


ICD Codes:  R74.8 - Abnormal levels of other serum enzymes


SNOMED:  298551323


(2) Urinary retention


ICD Codes:  R33.9 - Retention of urine, unspecified


SNOMED:  992772461


(3) Acute renal failure


ICD Codes:  N17.9 - Acute kidney failure, unspecified


SNOMED:  92227410


Qualifiers:  


   Qualified Codes:  N17.9 - Acute kidney failure, unspecified


Status:  stable, progressing


Assessment/Plan:


ivf


monitor labs and renal fxn


gi noted,


bowel regime





Subjective


ROS Limited/Unobtainable:  No


Constitutional:  Reports: no symptoms


HEENT:  Reports: no symptoms


Cardiovascular:  Reports: no symptoms


Respiratory:  Reports: no symptoms


Gastrointestinal/Abdominal:  Reports: constipated


Genitourinary:  Reports: no symptoms


Neurologic/Psychiatric:  Reports: no symptoms


Endocrine:  Reports: no symptoms


Hematologic/Lymphatic:  Reports: no symptoms


Allergies:  


Coded Allergies:  


     No Known Allergies (Unverified , 6/26/19)


All Systems:  reviewed and negative except above


Subjective


c/o constipation. on ivf. labs not done yet.





Objective





Last 24 Hour Vital Signs








  Date Time  Temp Pulse Resp B/P (MAP) Pulse Ox O2 Delivery O2 Flow Rate FiO2


 


6/29/19 12:00 97.8 85 17 119/64 (82) 97   


 


6/29/19 09:00      Room Air  


 


6/29/19 08:54  87  101/63    


 


6/29/19 08:00 98.1 87 16 101/63 (76) 97   


 


6/29/19 04:00 97.7 83 18 110/69 (83) 99   


 


6/29/19 00:00 97.6 81 18 117/67 (84) 97   


 


6/28/19 21:00      Room Air  


 


6/28/19 20:00 98.6 78 18 124/74 (91) 96   


 


6/28/19 16:00 97.9 89 18 111/71 (84) 97   

















Intake and Output  


 


 6/28/19 6/29/19





 19:00 07:00


 


Intake Total 1400 ml 1850 ml


 


Output Total 1200 ml 4050 ml


 


Balance 200 ml -2200 ml


 


  


 


Intake Oral 300 ml 750 ml


 


IV Total 1100 ml 1100 ml


 


Output Urine Total 1200 ml 4050 ml








Laboratory Tests


6/29/19 04:50: Lipase 808H


Height (Feet):  5


Height (Inches):  9.00


Weight (Pounds):  171


Objective


General Appearance:  WD/WN, alert


Neck:  supple


Cardiovascular:  normal rate


Respiratory/Chest:  chest wall non-tender, lungs clear, normal breath sounds


Abdomen:  normal bowel sounds, non tender, soft, no mass


Neurologic:  CNs II-XII grossly normal, alert, oriented x 3


Lymphatic:  normal anterior cervical (L), normal anterior cervical (R), normal 

posterior cervical (L), normal posterior cervical (R), normal submandibular (L)

, normal submandibular (R), normal supraclavicular (L), normal supraclavicular (

R), normal axillary (L), normal axillary (R), normal inguinal (L), normal 

inguinal (R), normal other











Elia Roth MD Jun 29, 2019 15:35

## 2019-06-29 NOTE — GENERAL PROGRESS NOTE
Assessment/Plan


Status:  stable, progressing


Assessment/Plan:


Assessment


- asymptomatic lipase elevation, doubt pancreatitis


- anemia, OB (-) x 1


- Edema


- b/l hydro per CT


- renal failure - improved


- loss of taste sensation





Recommendation


- po as tolerated


- follow lipase


- check B12 level - normal


- outpatient EGD/Colon





Subjective


Allergies:  


Coded Allergies:  


     No Known Allergies (Unverified , 6/26/19)


Subjective


above noted


had CT scan --> b/l hydronephrosis, ? urolithiasis


tolerating po


no abd pain





Objective





Last 24 Hour Vital Signs








  Date Time  Temp Pulse Resp B/P (MAP) Pulse Ox O2 Delivery O2 Flow Rate FiO2


 


6/29/19 16:00 98.6 86 16 99/64 (76) 97   


 


6/29/19 12:00 97.8 85 17 119/64 (82) 97   


 


6/29/19 09:00      Room Air  


 


6/29/19 08:54  87  101/63    


 


6/29/19 08:00 98.1 87 16 101/63 (76) 97   


 


6/29/19 04:00 97.7 83 18 110/69 (83) 99   


 


6/29/19 00:00 97.6 81 18 117/67 (84) 97   

















Intake and Output  


 


 6/28/19 6/29/19





 19:00 07:00


 


Intake Total 1400 ml 1850 ml


 


Output Total 1200 ml 4050 ml


 


Balance 200 ml -2200 ml


 


  


 


Intake Oral 300 ml 750 ml


 


IV Total 1100 ml 1100 ml


 


Output Urine Total 1200 ml 4050 ml








Laboratory Tests


6/29/19 04:50: Lipase 808H


6/29/19 17:20: 


Sodium Level 139, Potassium Level 4.2, Chloride Level 105, Carbon Dioxide Level 

24, Anion Gap 10, Blood Urea Nitrogen 32H, Creatinine 1.9H, Estimat Glomerular 

Filtration Rate , Glucose Level 104, Calcium Level 7.6L, Total Bilirubin 0.1L, 

Aspartate Amino Transf (AST/SGOT) 16, Alanine Aminotransferase (ALT/SGPT) 19, 

Alkaline Phosphatase 59, Total Protein 5.8L, Albumin 2.7L, Globulin 3.1, Albumin

/Globulin Ratio 0.9L


Height (Feet):  5


Height (Inches):  9.00


Weight (Pounds):  171


Objective


WDWN WM


NCAT


supple


CTA


RR


abd soft ND NT


no edema











Douglas Camargo MD Jun 29, 2019 21:28

## 2019-06-29 NOTE — NUR
NURSE NOTES:

Patient lying in bed awake. No complain of pain or distress at this time. Skin intact and 
dry. IV dressing intact and dry. Quan catheter patent and draining well. Bed lowest 
position. Call light within reach. Will continue to monitor.

## 2019-06-30 VITALS — DIASTOLIC BLOOD PRESSURE: 69 MMHG | SYSTOLIC BLOOD PRESSURE: 112 MMHG

## 2019-06-30 VITALS — DIASTOLIC BLOOD PRESSURE: 78 MMHG | SYSTOLIC BLOOD PRESSURE: 124 MMHG

## 2019-06-30 VITALS — SYSTOLIC BLOOD PRESSURE: 121 MMHG | DIASTOLIC BLOOD PRESSURE: 74 MMHG

## 2019-06-30 VITALS — DIASTOLIC BLOOD PRESSURE: 65 MMHG | SYSTOLIC BLOOD PRESSURE: 110 MMHG

## 2019-06-30 VITALS — SYSTOLIC BLOOD PRESSURE: 117 MMHG | DIASTOLIC BLOOD PRESSURE: 66 MMHG

## 2019-06-30 VITALS — DIASTOLIC BLOOD PRESSURE: 72 MMHG | SYSTOLIC BLOOD PRESSURE: 116 MMHG

## 2019-06-30 LAB
ALBUMIN SERPL-MCNC: 2.4 G/DL (ref 3.4–5)
ALBUMIN/GLOB SERPL: 0.6 {RATIO} (ref 1–2.7)
ALP SERPL-CCNC: 53 U/L (ref 46–116)
ALT SERPL-CCNC: 20 U/L (ref 12–78)
ANION GAP SERPL CALC-SCNC: 11 MMOL/L (ref 5–15)
AST SERPL-CCNC: 16 U/L (ref 15–37)
BILIRUB SERPL-MCNC: 0.2 MG/DL (ref 0.2–1)
BUN SERPL-MCNC: 30 MG/DL (ref 7–18)
CALCIUM SERPL-MCNC: 7.9 MG/DL (ref 8.5–10.1)
CHLORIDE SERPL-SCNC: 107 MMOL/L (ref 98–107)
CO2 SERPL-SCNC: 23 MMOL/L (ref 21–32)
CREAT SERPL-MCNC: 1.7 MG/DL (ref 0.55–1.3)
GLOBULIN SER-MCNC: 3.7 G/DL
POTASSIUM SERPL-SCNC: 3.6 MMOL/L (ref 3.5–5.1)
SODIUM SERPL-SCNC: 141 MMOL/L (ref 136–145)

## 2019-06-30 RX ADMIN — DOCUSATE SODIUM SCH MG: 250 CAPSULE, LIQUID FILLED ORAL at 09:00

## 2019-06-30 RX ADMIN — TAMSULOSIN HYDROCHLORIDE SCH MG: 0.4 CAPSULE ORAL at 21:17

## 2019-06-30 NOTE — GENERAL PROGRESS NOTE
Assessment/Plan


Status:  stable, progressing


Assessment/Plan:


Assessment


- asymptomatic lipase elevation, doubt pancreatitis


- anemia, OB (-) x 1


- Edema


- b/l hydro per CT


- renal failure - improved


- loss of taste sensation





Recommendation


- po as tolerated


- follow lipase


- check B12 level - normal


- outpatient EGD/Colon





Subjective


Allergies:  


Coded Allergies:  


     No Known Allergies (Unverified , 6/26/19)


Subjective


above noted


tolerating po


no abd pain





Objective





Last 24 Hour Vital Signs








  Date Time  Temp Pulse Resp B/P (MAP) Pulse Ox O2 Delivery O2 Flow Rate FiO2


 


6/30/19 12:00 97.9 80 20 117/66 (83) 98   


 


6/30/19 09:15  95  110/65    


 


6/30/19 09:00      Room Air  


 


6/30/19 08:51 97.7 95 18 110/65 (80) 99   


 


6/30/19 04:00 97.7 80 18 121/74 (90) 99   


 


6/30/19 00:00 97.9 78 18 124/78 (93) 97   


 


6/29/19 21:00      Room Air  


 


6/29/19 20:00 97.8 90 17 118/64 (82) 97   

















Intake and Output  


 


 6/29/19 6/30/19





 19:00 07:00


 


Intake Total 480 ml 1450 ml


 


Output Total  3100 ml


 


Balance 480 ml -1650 ml


 


  


 


Intake Oral 480 ml 450 ml


 


IV Total  1000 ml


 


Output Urine Total  3100 ml








Laboratory Tests


6/29/19 17:20: 


Sodium Level 139, Potassium Level 4.2, Chloride Level 105, Carbon Dioxide Level 

24, Anion Gap 10, Blood Urea Nitrogen 32H, Creatinine 1.9H, Estimat Glomerular 

Filtration Rate , Glucose Level 104, Calcium Level 7.6L, Total Bilirubin 0.1L, 

Aspartate Amino Transf (AST/SGOT) 16, Alanine Aminotransferase (ALT/SGPT) 19, 

Alkaline Phosphatase 59, Total Protein 5.8L, Albumin 2.7L, Globulin 3.1, Albumin

/Globulin Ratio 0.9L


6/30/19 04:45: 


Sodium Level 141, Potassium Level 3.6, Chloride Level 107, Carbon Dioxide Level 

23, Anion Gap 11, Blood Urea Nitrogen 30H, Creatinine 1.7H, Estimat Glomerular 

Filtration Rate , Glucose Level 93, Calcium Level 7.9L, Total Bilirubin 0.2, 

Aspartate Amino Transf (AST/SGOT) 16, Alanine Aminotransferase (ALT/SGPT) 20, 

Alkaline Phosphatase 53, Total Protein 6.1L, Albumin 2.4L, Globulin 3.7, Albumin

/Globulin Ratio 0.6L


Height (Feet):  5


Height (Inches):  9.00


Weight (Pounds):  171


Objective


WDWN WM


NCAT


supple


CTA


RR


abd soft ND NT


no edema











Douglas Camargo MD Jun 30, 2019 16:04

## 2019-06-30 NOTE — NUR
NURSE NOTES:

Patient lying in bed awake. No complain of pain or distress at this time. Skin intact and 
dry. IV dressing in tact and dry. Quan catheter patent and draining well. Bed lowest 
position. Call light within reach. Will continue to monitor.

## 2019-06-30 NOTE — NUR
NURSE NOTES:

Received report from JERED Swan. Patient in stable condition, no distress noted. Quan 
catheter intact, patent draining clear yellow urine. IV site RFA, 22 gauge, intact, infusing 
1/2 NS at 100 cc/hr. Denies pain. Bed in low position, call light within reach, will 
continue to monitor.

## 2019-06-30 NOTE — NUR
NURSE NOTES:

Spoke to  regarding eye drop and new order received. Order read back and carried 
out.

## 2019-06-30 NOTE — GENERAL PROGRESS NOTE
Assessment/Plan


Problem List:  


(1) Elevated lipase


ICD Codes:  R74.8 - Abnormal levels of other serum enzymes


SNOMED:  358770900


(2) Urinary retention


ICD Codes:  R33.9 - Retention of urine, unspecified


SNOMED:  914619830


(3) Acute renal failure


ICD Codes:  N17.9 - Acute kidney failure, unspecified


SNOMED:  49652683


Qualifiers:  


   Qualified Codes:  N17.9 - Acute kidney failure, unspecified


Status:  stable, progressing


Assessment/Plan:


ivf


monitor labs and renal fxn


gi noted,


bowel regime





Subjective


ROS Limited/Unobtainable:  No


Constitutional:  Reports: malaise, weakness


HEENT:  Reports: no symptoms


Cardiovascular:  Reports: no symptoms


Respiratory:  Reports: no symptoms


Gastrointestinal/Abdominal:  Reports: no symptoms


Genitourinary:  Reports: no symptoms


Neurologic/Psychiatric:  Reports: no symptoms


Endocrine:  Reports: no symptoms


Hematologic/Lymphatic:  Reports: no symptoms


Allergies:  


Coded Allergies:  


     No Known Allergies (Unverified , 6/26/19)


All Systems:  reviewed and negative except above


Subjective


c/o constipation. on ivf. labs improving





Objective





Last 24 Hour Vital Signs








  Date Time  Temp Pulse Resp B/P (MAP) Pulse Ox O2 Delivery O2 Flow Rate FiO2


 


6/30/19 09:15  95  110/65    


 


6/30/19 08:51 97.7 95 18 110/65 (80) 99   


 


6/30/19 04:00 97.7 80 18 121/74 (90) 99   


 


6/30/19 00:00 97.9 78 18 124/78 (93) 97   


 


6/29/19 21:00      Room Air  


 


6/29/19 20:00 97.8 90 17 118/64 (82) 97   


 


6/29/19 16:00 98.6 86 16 99/64 (76) 97   


 


6/29/19 12:00 97.8 85 17 119/64 (82) 97   

















Intake and Output  


 


 6/29/19 6/30/19





 19:00 07:00


 


Intake Total 480 ml 1450 ml


 


Output Total  3100 ml


 


Balance 480 ml -1650 ml


 


  


 


Intake Oral 480 ml 450 ml


 


IV Total  1000 ml


 


Output Urine Total  3100 ml








Laboratory Tests


6/29/19 17:20: 


Sodium Level 139, Potassium Level 4.2, Chloride Level 105, Carbon Dioxide Level 

24, Anion Gap 10, Blood Urea Nitrogen 32H, Creatinine 1.9H, Estimat Glomerular 

Filtration Rate , Glucose Level 104, Calcium Level 7.6L, Total Bilirubin 0.1L, 

Aspartate Amino Transf (AST/SGOT) 16, Alanine Aminotransferase (ALT/SGPT) 19, 

Alkaline Phosphatase 59, Total Protein 5.8L, Albumin 2.7L, Globulin 3.1, Albumin

/Globulin Ratio 0.9L


6/30/19 04:45: 


Sodium Level 141, Potassium Level 3.6, Chloride Level 107, Carbon Dioxide Level 

23, Anion Gap 11, Blood Urea Nitrogen 30H, Creatinine 1.7H, Estimat Glomerular 

Filtration Rate , Glucose Level 93, Calcium Level 7.9L, Total Bilirubin 0.2, 

Aspartate Amino Transf (AST/SGOT) 16, Alanine Aminotransferase (ALT/SGPT) 20, 

Alkaline Phosphatase 53, Total Protein 6.1L, Albumin 2.4L, Globulin 3.7, Albumin

/Globulin Ratio 0.6L


Height (Feet):  5


Height (Inches):  9.00


Weight (Pounds):  171


Objective


General Appearance:  WD/WN, alert


Neck:  supple


Cardiovascular:  normal rate


Respiratory/Chest:  chest wall non-tender, lungs clear, normal breath sounds


Abdomen:  normal bowel sounds, non tender, soft, no mass


Neurologic:  CNs II-XII grossly normal, alert, oriented x 3


Lymphatic:  normal anterior cervical (L), normal anterior cervical (R), normal 

posterior cervical (L), normal posterior cervical (R), normal submandibular (L)

, normal submandibular (R), normal supraclavicular (L), normal supraclavicular (

R), normal axillary (L), normal axillary (R), normal inguinal (L), normal 

inguinal (R), normal other











Elia Roth MD Jun 30, 2019 09:20

## 2019-07-01 VITALS — SYSTOLIC BLOOD PRESSURE: 113 MMHG | DIASTOLIC BLOOD PRESSURE: 62 MMHG

## 2019-07-01 VITALS — SYSTOLIC BLOOD PRESSURE: 114 MMHG | DIASTOLIC BLOOD PRESSURE: 66 MMHG

## 2019-07-01 VITALS — SYSTOLIC BLOOD PRESSURE: 126 MMHG | DIASTOLIC BLOOD PRESSURE: 71 MMHG

## 2019-07-01 VITALS — DIASTOLIC BLOOD PRESSURE: 62 MMHG | SYSTOLIC BLOOD PRESSURE: 113 MMHG

## 2019-07-01 VITALS — SYSTOLIC BLOOD PRESSURE: 107 MMHG | DIASTOLIC BLOOD PRESSURE: 65 MMHG

## 2019-07-01 LAB
ALBUMIN SERPL-MCNC: 2.3 G/DL (ref 3.4–5)
ALBUMIN/GLOB SERPL: 0.6 {RATIO} (ref 1–2.7)
ALP SERPL-CCNC: 54 U/L (ref 46–116)
ALT SERPL-CCNC: 18 U/L (ref 12–78)
ANION GAP SERPL CALC-SCNC: 9 MMOL/L (ref 5–15)
AST SERPL-CCNC: 20 U/L (ref 15–37)
BILIRUB SERPL-MCNC: 0.2 MG/DL (ref 0.2–1)
BUN SERPL-MCNC: 23 MG/DL (ref 7–18)
CALCIUM SERPL-MCNC: 7.9 MG/DL (ref 8.5–10.1)
CHLORIDE SERPL-SCNC: 107 MMOL/L (ref 98–107)
CO2 SERPL-SCNC: 22 MMOL/L (ref 21–32)
CREAT SERPL-MCNC: 1.4 MG/DL (ref 0.55–1.3)
GLOBULIN SER-MCNC: 3.7 G/DL
POTASSIUM SERPL-SCNC: 4.2 MMOL/L (ref 3.5–5.1)
SODIUM SERPL-SCNC: 137 MMOL/L (ref 136–145)

## 2019-07-01 RX ADMIN — DOCUSATE SODIUM SCH MG: 250 CAPSULE, LIQUID FILLED ORAL at 08:10

## 2019-07-01 NOTE — GENERAL PROGRESS NOTE
Assessment/Plan


Status:  stable, progressing


Assessment/Plan:


Assessment


- asymptomatic lipase elevation, doubt pancreatitis


- anemia, OB (-) x 1


- Edema


- b/l hydro per CT


- renal failure - improved


- loss of taste sensation





Recommendation


- po as tolerated


- follow lipase


- check B12 level - normal


- outpatient EGD/Colon





Subjective


Allergies:  


Coded Allergies:  


     No Known Allergies (Unverified , 6/26/19)


Subjective


above noted


tolerating po


no abd pain


(+) BM yesterday





Objective





Last 24 Hour Vital Signs








  Date Time  Temp Pulse Resp B/P (MAP) Pulse Ox O2 Delivery O2 Flow Rate FiO2


 


7/1/19 08:10  77  126/71    


 


7/1/19 08:00 97.9 89 20 113/62 (79) 98   


 


7/1/19 07:37      Room Air  


 


7/1/19 04:00 97.9 77 16 126/71 (89) 99   


 


7/1/19 00:00 97.9 78 17 114/66 (82) 98   


 


6/30/19 21:00      Room Air  


 


6/30/19 20:00 98.1 80 16 112/69 (83) 98   


 


6/30/19 16:00 98.0 83 18 116/72 (87) 98   


 


6/30/19 12:00 97.9 80 20 117/66 (83) 98   

















Intake and Output  


 


 6/30/19 7/1/19





 19:00 07:00


 


Intake Total 400 ml 1340 ml


 


Output Total 1500 ml 2500 ml


 


Balance -1100 ml -1160 ml


 


  


 


Intake Oral 300 ml 240 ml


 


IV Total 100 ml 1100 ml


 


Output Urine Total 1500 ml 2500 ml








Laboratory Tests


7/1/19 09:00: 


Sodium Level 137, Potassium Level 4.2, Chloride Level 107, Carbon Dioxide Level 

22, Anion Gap 9, Blood Urea Nitrogen 23H, Creatinine 1.4H, Estimat Glomerular 

Filtration Rate , Glucose Level 128H, Calcium Level 7.9L, Total Bilirubin 0.2, 

Aspartate Amino Transf (AST/SGOT) 20, Alanine Aminotransferase (ALT/SGPT) 18, 

Alkaline Phosphatase 54, Total Protein 6.0L, Albumin 2.3L, Globulin 3.7, Albumin

/Globulin Ratio 0.6L


Height (Feet):  5


Height (Inches):  9.00


Weight (Pounds):  171


Objective


WDWN WM


NCAT


supple


CTA


RR


abd soft ND NT


no edema











Douglas Camargo MD Jul 1, 2019 11:28

## 2019-07-01 NOTE — NUR
NURSE NOTES:D/C INSTRUCTIONS INCLUDING CARE OF LIU CATH,AS TO HOW TO EMPTY,RECORD AND USE 
OF LEG BAG.WITH RETURN DEMO FR.PATIENT AND 2 SISTERS.STABLE ON DISCHARGE.BY PRIVATE VEHICLE.

## 2019-07-01 NOTE — GENERAL PROGRESS NOTE
Assessment/Plan


Status:  stable, progressing


Assessment/Plan:





IMPRESSION:


1. Obstructive uropathy with acute renal failure.


2. Bilateral hydronephrosis.


3. Distended bladder.


4. Hypertension.


5. Vitamin D deficiency.





PLAN


care noted and reviewed


wade on dc


 after dc


renal follow up 


hydrate- may dc


monitor 


d/w patient and plan to dc today if able with HH and proscar and flomax


impression, plan, and exam edited and reviewed in detail


care discussed with RN





Subjective


Allergies:  


Coded Allergies:  


     No Known Allergies (Unverified , 6/26/19)


Subjective


care noted


renal function better


uro noted- ok to dc with wade





Objective





Last 24 Hour Vital Signs








  Date Time  Temp Pulse Resp B/P (MAP) Pulse Ox O2 Delivery O2 Flow Rate FiO2


 


7/1/19 08:10  77  126/71    


 


7/1/19 08:00 97.9 89 20 113/62 (79) 98   


 


7/1/19 07:37      Room Air  


 


7/1/19 04:00 97.9 77 16 126/71 (89) 99   


 


7/1/19 00:00 97.9 78 17 114/66 (82) 98   


 


6/30/19 21:00      Room Air  


 


6/30/19 20:00 98.1 80 16 112/69 (83) 98   


 


6/30/19 16:00 98.0 83 18 116/72 (87) 98   


 


6/30/19 12:00 97.9 80 20 117/66 (83) 98   

















Intake and Output  


 


 6/30/19 7/1/19





 19:00 07:00


 


Intake Total 400 ml 1340 ml


 


Output Total 1500 ml 2500 ml


 


Balance -1100 ml -1160 ml


 


  


 


Intake Oral 300 ml 240 ml


 


IV Total 100 ml 1100 ml


 


Output Urine Total 1500 ml 2500 ml








Laboratory Tests


7/1/19 09:00: 


Sodium Level 137, Potassium Level 4.2, Chloride Level 107, Carbon Dioxide Level 

22, Anion Gap 9, Blood Urea Nitrogen 23H, Creatinine 1.4H, Estimat Glomerular 

Filtration Rate , Glucose Level 128H, Calcium Level 7.9L, Total Bilirubin 0.2, 

Aspartate Amino Transf (AST/SGOT) 20, Alanine Aminotransferase (ALT/SGPT) 18, 

Alkaline Phosphatase 54, Total Protein 6.0L, Albumin 2.3L, Globulin 3.7, Albumin

/Globulin Ratio 0.6L


Height (Feet):  5


Height (Inches):  9.00


Weight (Pounds):  171


Objective


GENERAL:  A well-developed male, comfortable at present.


HEENT:  Negative.  Extraocular movements are grossly intact.


NECK:  Supple.


LUNGS:  Fairly clear and symmetric.  No rhonchi or wheezes.


CARDIAC:  S1 and S2.  Regular rate and rhythm without murmurs, rubs, or


gallops.


ABDOMEN:  Soft, nontender, and nondistended.


EXTREMITIES:  No cyanosis or clubbing.  no edema


NEUROLOGIC:  Grossly nonfocal.


wade in place











Pasha Diaz MD Jul 1, 2019 10:39

## 2019-07-01 NOTE — NUR
NURSE NOTES:BEDSIDE ROUNDS WITH OUTGOING RN(RUBENS),PATIENT HAVING BREAKFAST,A/PX4,ROOM 
AIR,IV SITE INTACT,NO C/O PAIN.WILL CONTINUE PLAN OF CARE.

## 2019-07-01 NOTE — NUR
NOTES





CLINICALS FAXED TO [facility] TO [facility] PATIENT.

-------------------------------------------------------------------------------

Addendum: 07/01/19 at 1735 by Xuan Segal CM

-------------------------------------------------------------------------------

[facility] LEFT TO VERIFY IS [facility] CAN ACCEPT THIS PT

## 2019-07-02 NOTE — DISCHARGE SUMMARY
Discharge Summary


Discharge Summary


_


DATE OF ADMISSION: 6/26/2019  





DATE OF DISCHARGE: 7/01/2019








DISCHARGED BY: Dr. Diaz





REASON FOR ADMISSION: 


73 years old male with past medical history of hypertension, hypothyroidism, 

GERD, cognitive dysfunction, history of lower extremity edema, was seen in the 

office with worsening lower extremity edema and difficulty with urinary output.

  


Patient was noted to have acute renal failure and was asked to come to 

emergency room for admission and further work-up and management.  


Patient had a Quan catheter placed with significant amount of urinary 

retention - 1500 mL.  


Patient denied abdominal pain.  


Laboratory work-up revealed mild leukocytosis WBC 11, hemoglobin 10.2, 

hematocrit 21.6.


Evidence of renal failure with  BUN 81, creatinine 4.7.  


Sodium 133.  


CO2 19.


Troponin negative.


Stable LFT.  


Lipase 1231.


Urinalysis revealed no evidence of UTI.


Patient subsequently admitted for further management for renal failure and 

urinary retention .


 


CONSULTANTS:


GI specialist Dr. Camargo





 


 


[facility] [place] COURSE: 


Patient  admitted,  IV hydration continued.


Renal ultrasound revealed moderate  bilateral hydronephrosis.


CT of the abdomen and pelvis demonstrated moderate to severe bilateral 

hydronephrosis with thickened uroepithelium and periureteral and perinephric 

stranding, suggestive of chronic inflammation.  


The findings were probably associated with thickening of the wall of the 

urinary bladder, moderate degree.  


3 mm calcifications in the left lower quadrant abdomen.  This is the 

approximate location of the mid left ureter although the calcium is favored to 

represent arterial calcification rather than ureteral stone. 





Urologist seen and evaluated patient


Per urologist, patient had  evidence of acute renal failure in setting of 

urinary retention.  


The picture was consistent with obstructive uropathy.  


After catheter drainage , creatinine improved from 4.7 down to 2.  


Per urologist, patient had a massive urinary retention with 1.5 L drained after 

catheter placement.  


Diagnostic imaging also revealed evidence of urinary retention, bilateral 

hydroureteronephrosis. 


It was unclear  at this time if it was   secondary to prostate  or neurogenic 

bladder. 


Urologist recommended to continue catheter.


Once  creatinine returns to normal , Quan catheter can be removed to check if 

patient can urinate without the aid and if he improved on the prostate 

medications.


Creatinine from 4.7 down to 1.4, and  BUN from 81 down to 23.  


Proscar and Flomax were continued.





GI Specialist followed.  


Patient had asymptomatic lipase elevation , but no complaint of abdominal pain.

  


Lipase trending down . GI specialist doubted  pancreatitis.  


Patient noted to be anemic . Anemia work-up revealed evidence of anemia of 

chronic disease.  


Stool for occult blood was negative.  B12 level stable.  


GI specialist recommended outpatient EGD and colonoscopy.


Bowel regimen instituted.





Vitamin D continued.


Blood pressure was managed with calcium channel blocker , and remained stable.  


Synthroid continued.


Supportive care provided.   





Home health services were arranged.  


Patient was cleared for discharge home with Quan catheter on Proscar and 

Flomax.  


Outpatient follow-up with ENT specialist 


Outpatient follow-up with  GI  specialist for outpatient GI procedure. 





FINAL DIAGNOSES: 


Obstructive uropathy with acute renal failure


Bilateral hydronephrosis


Urinary retention


Hypertension


Vitamin D deficiency


Anemia


Asymptomatic lipase elevation











DISCHARGE MEDICATIONS:


See Medication Reconciliation list.





DISCHARGE INSTRUCTIONS:


Patient was discharged home with home health services.  


Follow up with primary care provider in one week.





I have been assigned to dictate discharge summary for this account. 


I was not involved in the patient's management.











Juliet Hawley NP Jul 2, 2019 12:42

## 2020-10-30 NOTE — NUR
NURSE NOTES:

Received report from JERED Swan and rounds made. Pt in bed, AOX4, denies any pain or 
discomfort, no distress noted. wade to gravity with pinkish color output, no clots noted. 
JERED Swan will notify Dr. Diaz. IV patent and intact, IV fluid infusing as ordered. Bed 
in lowest position and locked, side railsup x 2, call light within reach. Will continue to 
monitor. due to increased pain/unable to perform